# Patient Record
Sex: FEMALE | Race: WHITE
[De-identification: names, ages, dates, MRNs, and addresses within clinical notes are randomized per-mention and may not be internally consistent; named-entity substitution may affect disease eponyms.]

---

## 2018-02-27 ENCOUNTER — HOSPITAL ENCOUNTER (EMERGENCY)
Dept: HOSPITAL 14 - H.EROB2 | Age: 33
Discharge: HOME | End: 2018-02-27
Payer: COMMERCIAL

## 2018-02-27 VITALS — DIASTOLIC BLOOD PRESSURE: 51 MMHG | HEART RATE: 95 BPM | SYSTOLIC BLOOD PRESSURE: 117 MMHG

## 2018-02-27 DIAGNOSIS — O26.93: Primary | ICD-10-CM

## 2018-02-27 DIAGNOSIS — O48.0: ICD-10-CM

## 2018-02-27 DIAGNOSIS — R10.2: ICD-10-CM

## 2018-02-27 DIAGNOSIS — Z3A.40: ICD-10-CM

## 2018-02-27 NOTE — OBHP
===========================

Datetime: 2018 12:34

===========================

   

IP Adm Impression:  Term, intrauterine pregnancy; No Active Labor; Intact Membranes

IP Admit Plan:  Discharge home

Admit Comment, IP Provider:  33 yo  at 40.3 weeks gestational age with LEANN 18 presented t
o [facility] with complaint of abdominal pressure and vaginal leaking. Reports good fetal movement, denies 
contractions or vaginal bleeding.

      

   Prenatal care: [place]; next 

   OBhx: 1 prior NVD 

   Med hx: denies chronic medical problems

   Surg hx: none

   Fam hx: noncontributory

   Social hx: none

   Allergies: nkda

   Meds: PNV

      

   ROS: reviewed and denies further complaints aside from as per HPI

      

   PE:

   Gen: alert, oriented

   CV: S1S2, RRR

   Resp: clear breath sounds, normal respiratory effort

   Abd: gravid, +BS

   Ext: no significant edema

   SSE: cervix closed, no gross pooling

   SVE: FT, long, thick

   Bedside sono: vertex presentation

   Fluid analysis under microscope: no ferning visualized

   FHR reactive, +accels

      

   Assessment: 33 yo  with IUP at 40.3 weeks gestational age, no signs of labor. 

   Plan: Discharge home with labor precautions; advised to call [facility] this week regarding pawel
ction.

   Pt seen and discussed w/ Dr. Dakota torrespginga

      

      

   Ob hospitalist note: With PGY1 I saw and examined this patient.  Agree with note. MAHNDO

Pelvic Type - PN:  Adequate

Extremities - PN:  Normal

Abdomen - PN:  Normal

Back - PN:  Not Done

Breast - PN:  Not Done

Lungs - PN:  Normal

Heart - PN:  Normal

Thyroid - PN:  Not Done

Neurologic - PN:  Normal

HEENT - PN:  Normal

General - PN:  Normal

Fetal Presentation-Admit:  Vertex

IP Fetus A Comments:  sonogram cephalic; QUIANA 12cm

FHR - Baseline A Provider:  140

Membranes, Provider:  Intact

Comments, ACOG Physical Exam:  SSE: cervix closed, no gross pooling

   SVE: FT, long, thick

   Bedside sono: vertex presentation

   Fluid analysis under microscope: no ferning visualized

   FHR reactive, +accels

Pool Provider:  Negative

Ferning Provider:  Negative

IP Prenatal Hx Assessment:  The Prenatal History has been Reviewed and is Current

EGA AdmitDate IP:  40.3

Vital Signs Provider:  Reviewed; Within Normal Limits

IP Chief Complaint:  Suspected ruptured membranes; Maternal discomfort

NICHD Variability Prov Fetus A:  Moderate 6-25bpm

NICHD Accel Fetus A IP Provider:  15X15

FHR Category Provider Fetus A:  Category I

Dilatation, Provider:  FT

Effacement, Provider:  0

Station, Provider:  high

Genitourinary Exam:  Normal

DTRs - PN:  Not Done

## 2018-02-27 NOTE — OBDCSUM
===========================

Datetime: 02/27/2018 12:33

===========================

   

Discharged to, Provider:  Home

Follow up at, Provider:  [facility]

Disch Instr Activity:  Normal activity

Disch Instr Diet:  Regular

Discharge Diagnosis, Provider:  False Labor - Undelivered

Discharge Time:  02/27/2018 12:33

Follow up in weeks, Provider:  today

Disch Referrals:  None

Discharge Comment, Provider:  

   Ob hospitalist note:  I saw this patient.  Agree with note. DINORA

## 2018-03-02 ENCOUNTER — HOSPITAL ENCOUNTER (INPATIENT)
Dept: HOSPITAL 14 - H.EROB2 | Age: 33
LOS: 2 days | Discharge: HOME | End: 2018-03-04
Attending: OBSTETRICS & GYNECOLOGY | Admitting: OBSTETRICS & GYNECOLOGY
Payer: COMMERCIAL

## 2018-03-02 VITALS — BODY MASS INDEX: 29.2 KG/M2

## 2018-03-02 DIAGNOSIS — Z3A.40: ICD-10-CM

## 2018-03-02 LAB
BASOPHILS # BLD AUTO: 0 K/UL (ref 0–0.2)
BASOPHILS NFR BLD: 0.2 % (ref 0–2)
EOSINOPHIL # BLD AUTO: 0 K/UL (ref 0–0.7)
EOSINOPHIL NFR BLD: 0.6 % (ref 0–4)
ERYTHROCYTE [DISTWIDTH] IN BLOOD BY AUTOMATED COUNT: 17.3 % (ref 11.5–14.5)
HGB BLD-MCNC: 9.7 G/DL (ref 12–16)
LYMPHOCYTES # BLD AUTO: 1.2 K/UL (ref 1–4.3)
LYMPHOCYTES NFR BLD AUTO: 21.6 % (ref 20–40)
MCH RBC QN AUTO: 25.9 PG (ref 27–31)
MCHC RBC AUTO-ENTMCNC: 32 G/DL (ref 33–37)
MCV RBC AUTO: 80.9 FL (ref 81–99)
MONOCYTES # BLD: 0.4 K/UL (ref 0–0.8)
MONOCYTES NFR BLD: 6.1 % (ref 0–10)
NEUTROPHILS # BLD: 4.1 K/UL (ref 1.8–7)
NEUTROPHILS NFR BLD AUTO: 71.5 % (ref 50–75)
NRBC BLD AUTO-RTO: 0.1 % (ref 0–0)
PLATELET # BLD: 239 K/UL (ref 130–400)
PMV BLD AUTO: 9.1 FL (ref 7.2–11.7)
RBC # BLD AUTO: 3.74 MIL/UL (ref 3.8–5.2)
WBC # BLD AUTO: 5.8 K/UL (ref 4.8–10.8)

## 2018-03-02 PROCEDURE — 4A1HXCZ MONITORING OF PRODUCTS OF CONCEPTION, CARDIAC RATE, EXTERNAL APPROACH: ICD-10-PCS | Performed by: OBSTETRICS & GYNECOLOGY

## 2018-03-02 RX ADMIN — Medication PRN SPRAYS: at 16:46

## 2018-03-02 NOTE — OBADHP
===========================

Datetime: 2018 00:49

===========================

   

Admit Comment, IP Provider:  33 yo  at 40.6 weeks gestational age with LEANN 18 presented t
o [facility] with complaint of leakage of fluids for 2 hours PTA. Pt reports ctx q10 after the LOF.  Report
s good fetal movement. Denies VB. Pt is GBS neg. 

      

   Prenatal care: [facility]

   PNL: A+, ab neg, rpr neg, hiv neg, hbsag neg, gc/chl neg, gbs neg, rubella immune

   US on 18: BPP 

   OBhx: 1 prior NVD  4 yrs ago, no complications

   Med hx: anemia

   Surg hx: none

   Fam hx: noncontributory

   Social hx: none

   Allergies: nkda

   Meds: PNV

      

   ROS: reviewed and denies further complaints aside from as per HPI

      

      

   Assessment: 33 yo  with IUP at 40.6 weeks GA has SROM.

      

   Plan: 

   Admit to L_D

   Continuous fetal heart tracing

   Initiate labor protocol

   Labs

   GBS neg

      

   Case d/w on-call OB hospitalist Dr. Ammon Lozoya, PGY-1

Extremities - PN:  Normal

Abdomen - PN:  Normal

Back - PN:  Normal

Lungs - PN:  Normal

Heart - PN:  Normal

Neurologic - PN:  Normal

HEENT - PN:  Normal

General - PN:  Normal

FHR - Baseline A Provider:  140S

Amniotic Fluid Color, Provider:  Clear

Membranes, Provider:  Ruptured

Comments, ACOG Physical Exam:  SSE: grossly SROM. Clear fluids. 

   SVE: /-2

      

Pool Provider:  Positive

IP Prenatal Hx Assessment:  The Prenatal History has been Reviewed and is Current

IP Chief Complaint:  Suspected ruptured membranes

NICHD Variability Prov Fetus A:  Moderate 6-25bpm

NICHD Accel Fetus A IP Provider:  15X15

FHR Category Provider Fetus A:  Category I

NICHD Decel Fetus A IP Provider:  None

Dilatation, Provider:  1

Effacement, Provider:  75

Station, Provider:  -2

Genitourinary Exam:  Normal

EGA AdmitDate IP:  40.6

IP Adm Impression:  Term, intrauterine pregnancy

IP Admit Plan:  Admit to unit; Initiate labor protocol

   

===========================

Datetime: 2018 12:34

===========================

   

Pelvic Type - PN:  Adequate

Breast - PN:  Not Done

Thyroid - PN:  Not Done

Fetal Presentation-Admit:  Vertex

IP Fetus A Comments:  sonogram cephalic; QUIANA 12cm

Ferning Provider:  Negative

Vital Signs Provider:  Reviewed; Within Normal Limits

DTRs - PN:  Not Done

## 2018-03-02 NOTE — OBDS
===================================

DELIVERY PERSONNEL

===================================

   

Anesthesiologist:  fran MD 

   

===================================

MATERNAL INFORMATION

===================================

   

Delivery Anesthesia:  Epidural

Provider Comments:   of live male infant over intact perineum  in ANGEL presentation with nuchal
 cord, followed by shoulders and rest of infant atraumatically, mouth and nose suctioned, cord clampe
d and cut, cord blood obtained, placenta delivered spontaneously, fundus firm, EBL=150mL, perineal la
ceration repaired with 3-0 Vicryl rapide, pt tolerated procedure well

   

===================================

LABOR SUMMARY

===================================

   

EDC:  2018 00:00

No. Babies in Womb:  1

 Attempted:  No

Labor Anesthesia:  Epidural

   

===================================

LABOR INFORMATION

===================================

   

Reason for Induction:  Not Applicable

Oxytocin:  N/A

Group B Beta Strep:  Negative

Steroids Given:  None

Reason Steroids Not Administered:  Not Applicable

   

===================================

MEMBRANES

===================================

   

Membranes Rupture Method:  Spontaneous

Rupture of Membranes:  2018 23:20

Amniotic Fluid Color:  Clear

Amniotic Fluid Amount:  Moderate

Amniotic Fluid Odor:  Normal

   

===================================

BABY A INFORMATION

===================================

   

Born in [place] :  No

:  N/A

## 2018-03-03 LAB
ERYTHROCYTE [DISTWIDTH] IN BLOOD BY AUTOMATED COUNT: 17 % (ref 11.5–14.5)
HGB BLD-MCNC: 8.7 G/DL (ref 12–16)
MCH RBC QN AUTO: 26.8 PG (ref 27–31)
MCHC RBC AUTO-ENTMCNC: 33.3 G/DL (ref 33–37)
MCV RBC AUTO: 80.5 FL (ref 81–99)
PLATELET # BLD: 199 K/UL (ref 130–400)
RBC # BLD AUTO: 3.25 MIL/UL (ref 3.8–5.2)
WBC # BLD AUTO: 7.9 K/UL (ref 4.8–10.8)

## 2018-03-03 RX ADMIN — PRENATAL VIT W/ FE FUMARATE-FA TAB 27-0.8 MG SCH TAB: 27-0.8 TAB at 09:22

## 2018-03-04 VITALS
SYSTOLIC BLOOD PRESSURE: 111 MMHG | DIASTOLIC BLOOD PRESSURE: 68 MMHG | TEMPERATURE: 98.2 F | HEART RATE: 78 BPM | OXYGEN SATURATION: 99 % | RESPIRATION RATE: 20 BRPM

## 2018-03-04 RX ADMIN — PRENATAL VIT W/ FE FUMARATE-FA TAB 27-0.8 MG SCH TAB: 27-0.8 TAB at 08:55

## 2018-03-04 RX ADMIN — Medication PRN SPRAYS: at 08:55

## 2018-03-04 NOTE — OBPPN
===========================

Datetime: 2018 09:01

===========================

   

PP Pain Prov:  Within normal limits

PP Nausea Prov:  Denies

PP Flatus Prov:  Yes

PP BM Prov:  Yes

PP Heart Prov:  Normal

PP Lungs Prov:  Normal

PP Abdomen/Uterus Prov:  Normal

PP Lochia Prov:  Normal

PP Vulva/Perineum Prov:  Normal

PP CVA Tenderness Prov:  Normal

PP Extremities Prov:  Normal

PP C/S Incision Prov:  Not Applicable

PP Breastfeeding Progress Prov:  Normal

PP Impression Prov:  Normal postpartum progression

PP Plan Prov:  Continue present management; Discharge

PP Progress Note Prov:  PPD #2

   33 y/o  now  seen and examined at bedside this morning. NAD, no acute event overnight. Mild
 pain but well controlled on pain meds. Patient is tolerating PO intake, voiding freely, breastfeedin
g w/o any difficulty and ambulating. + BM today. Baby is doing well as per mom. 

      

   Physical Exam:

   General: A_O, resting comfortably in bed, NAD

   HEENT:  oral mucosa moist.

   Lungs: CTA B/L, no wheezing, rhonchi or rales

   CVS: RRR, S1, S2 

   ABD: ND, +BS, firm fundus @ umbilical level. Soft, appropriate TTP. 

   EXT: no edema, negative Filippo's sign, 

   Neuro/psych: AAOX3.

      

   A/P:

   33 y/o  now  doing well on PPD 1

      

      

   Encourage breastfeeding and ambulating

      

   Ibuprofen 600mg for pain

      

   Ambulatory with caution

      

   Nothing per Vagina, no heavy lifting, avoid stairs for few days

      

   If excessive bleeding or fever without relief from Tylenol go to ED

      

   Follow up at Pediatrician with in one week

      

   Follow up with PCP for mom, Appt given 

      

   Discharge home today, ER precautions d/w patient 

      

   Case d/w on-call OB hospitalist

      

   --- Emmanuel Parker, PGY-1 

IP PP Procedures:  None

Vital Signs Provider PP:  Reviewed

## 2018-03-04 NOTE — OBPPN
===========================

Datetime: 2018 09:01

===========================

   

PP Progress Note Prov:  PPD #2

   31 y/o  now  seen and examined at bedside this morning. NAD, no acute event overnight. Mild
 pain but well controlled on pain meds. Patient is tolerating PO intake, voiding freely, breastfeedin
g w/o any difficulty and ambulating. + BM today. Baby is doing well as per mom. 

      

   Physical Exam:

   General: A_O, resting comfortably in bed, NAD

   HEENT:  oral mucosa moist.

   Lungs: CTA B/L, no wheezing, rhonchi or rales

   CVS: RRR, S1, S2 

   ABD: ND, +BS, firm fundus @ umbilical level. Soft, appropriate TTP. 

   EXT: no edema, negative Filippo's sign, 

   Neuro/psych: AAOX3.

      

   A/P:

   31 y/o  now  doing well on PPD 1

      

      

   Encourage breastfeeding and ambulating

      

   Ibuprofen 600mg for pain

      

   Ambulatory with caution

      

   Nothing per Vagina, no heavy lifting, avoid stairs for few days

      

   If excessive bleeding or fever without relief from Tylenol go to ED

      

   Follow up at Pediatrician with in one week

      

   Follow up with PCP for mom, Appt given 

      

   Discharge home today, ER precautions d/w patient 

      

   Case d/w on-call OB hospitalist

      

   --- Emmanuel Parker, PGY-1 

      

   Addendum by Dr. Barber: Patient evaluated independently and I agree with the above. Pt to be discha
rged home

## 2018-03-04 NOTE — NBCIR
===========================

Datetime: 02/27/2018 12:05

===========================

   

Preformed by::  [place]

Circumcision Request:  Yes

Consent Signed:  Written Consent Signed and on Chart

Position:  [place] Board

Circumcision Time Out:  Correct Patient Identity; Correct Side and Site are Marked; Accurate Procedur
e Consent Form; Agreement on Procedure to be Done; Correct Patient Position

Site Prep:  Povidine Iodine

Circumcision Date/Time:  03/03/2018 15:28

Block/Anesthestics:  1 Percent Lidocaine; Dorsal Nerve Block

Equipment Used:  MobiDougho Clamp

Bell Size:  1.3

Systemic Medications:  None

Complications:  None

Status:  Excellent Cosmetic Outcome; Tolerated Procedure Well; Hemostatic

Parents Present:  None

Procedure Note:  No complications. Patient tolerated procedure well.

   

===========================

Datetime: 02/27/2018 11:43

===========================

   

PT-NAME:  MINDI CRAWFORD

## 2018-03-04 NOTE — OBDCSUM
===========================

Datetime: 2018 09:05

===========================

   

Discharged to, Provider:  Home

Follow up at, Provider:  [facility]

Disch Instr Activity:  Normal activity

Disch Instr Diet:  Regular

Discharge Instructions, Provider:  Routine instructions given

Discharge Diagnosis, Provider:  Term Pregnancy Delivered

Discharge Time:  2018 10:00

Follow up in weeks, Provider:  4-6 weeks PP, 2-3 days Pediatrician

Disch Referrals:  None

Contraception discussed, Prov:  Yes

Discharge Comment, Provider:  PPD #2

   31 y/o  now  seen and examined at bedside this morning. NAD, no acute event overnight. Mild
 pain but well controlled on pain meds. Patient is tolerating PO intake, voiding freely, breastfeedin
g w/o any difficulty and ambulating. + BM today. Baby is doing well as per mom. 

      

   Physical Exam:

   General: A_O, resting comfortably in bed, NAD

   HEENT:  oral mucosa moist.

   Lungs: CTA B/L, no wheezing, rhonchi or rales

   CVS: RRR, S1, S2 

   ABD: ND, +BS, firm fundus @ umbilical level. Soft, appropriate TTP. 

   EXT: no edema, negative Filippo's sign, 

   Neuro/psych: AAOX3.

      

   A/P:

   31 y/o  now  doing well on PPD 1

      

      

   Encourage breastfeeding and ambulating

      

   Ibuprofen 600mg for pain

      

   Ambulatory with caution

      

   Nothing per Vagina, no heavy lifting, avoid stairs for few days

      

   If excessive bleeding or fever without relief from Tylenol go to ED

      

   Follow up at Pediatrician with in one week

      

   Follow up with PCP for mom, Appt given 

      

   Discharge home today, ER precautions d/w patient 

      

   Patient agrees with discharge paln 

      

   Case d/w on-call OB hospitalist

      

   --- Emmanuel Parker, PGY-1